# Patient Record
Sex: FEMALE | Race: BLACK OR AFRICAN AMERICAN | Employment: FULL TIME | URBAN - METROPOLITAN AREA
[De-identification: names, ages, dates, MRNs, and addresses within clinical notes are randomized per-mention and may not be internally consistent; named-entity substitution may affect disease eponyms.]

---

## 2018-07-26 LAB
HBV SURFACE AG SERPL QL IA: NORMAL
HIV 1+2 AB+HIV1 P24 AG SERPL QL IA: NEGATIVE
RUBELLA ANTIBODY IGG QUANTITATIVE: NORMAL IU/ML

## 2019-02-07 SDOH — HEALTH STABILITY: MENTAL HEALTH: HOW OFTEN DO YOU HAVE A DRINK CONTAINING ALCOHOL?: NEVER

## 2019-02-11 NOTE — PHARMACY-ADMISSION MEDICATION HISTORY
Admission medication history interview status for this patient is complete. See [facility] admission navigator for allergy information, prior to admission medications and immunization status.     PTA meds completed by pre-admitting nurse Katelyn Rutherford and reviewed by pharmacy       Prior to Admission medications    Medication Sig Last Dose Taking? Auth Provider   omeprazole (PRILOSEC) 20 MG DR capsule Take 20 mg by mouth daily  Yes Reported, Patient   Prenatal MV-Min-Fe Fum-FA-DHA (PRENATAL 1 PO) Take 1 tablet by mouth daily   Yes Reported, Patient

## 2019-02-12 ENCOUNTER — HOSPITAL ENCOUNTER (OUTPATIENT)
Dept: LAB | Facility: CLINIC | Age: 39
Discharge: HOME | End: 2019-02-12
Attending: OBSTETRICS & GYNECOLOGY | Admitting: OBSTETRICS & GYNECOLOGY
Payer: COMMERCIAL

## 2019-02-12 DIAGNOSIS — Z01.812 PRE-OPERATIVE LABORATORY EXAMINATION: ICD-10-CM

## 2019-02-12 LAB
ABO + RH BLD: NORMAL
ABO + RH BLD: NORMAL
BLD GP AB SCN SERPL QL: NORMAL
BLOOD BANK CMNT PATIENT-IMP: NORMAL
HGB BLD-MCNC: 11.9 G/DL (ref 11.7–15.7)
SPECIMEN EXP DATE BLD: NORMAL

## 2019-02-12 PROCEDURE — 85018 HEMOGLOBIN: CPT | Performed by: OBSTETRICS & GYNECOLOGY

## 2019-02-12 PROCEDURE — 36415 COLL VENOUS BLD VENIPUNCTURE: CPT | Performed by: OBSTETRICS & GYNECOLOGY

## 2019-02-12 PROCEDURE — 86901 BLOOD TYPING SEROLOGIC RH(D): CPT | Performed by: OBSTETRICS & GYNECOLOGY

## 2019-02-12 PROCEDURE — 86850 RBC ANTIBODY SCREEN: CPT | Performed by: OBSTETRICS & GYNECOLOGY

## 2019-02-12 PROCEDURE — 86900 BLOOD TYPING SEROLOGIC ABO: CPT | Performed by: OBSTETRICS & GYNECOLOGY

## 2019-02-12 PROCEDURE — 0064U ANTB TP TOTAL&RPR IA QUAL: CPT | Performed by: OBSTETRICS & GYNECOLOGY

## 2019-02-13 ENCOUNTER — ANESTHESIA (OUTPATIENT)
Dept: OBGYN | Facility: CLINIC | Age: 39
End: 2019-02-13
Payer: COMMERCIAL

## 2019-02-13 ENCOUNTER — ANESTHESIA EVENT (OUTPATIENT)
Dept: OBGYN | Facility: CLINIC | Age: 39
End: 2019-02-13
Payer: COMMERCIAL

## 2019-02-13 ENCOUNTER — HOSPITAL ENCOUNTER (INPATIENT)
Facility: CLINIC | Age: 39
LOS: 3 days | Discharge: HOME | End: 2019-02-16
Attending: OBSTETRICS & GYNECOLOGY | Admitting: OBSTETRICS & GYNECOLOGY
Payer: COMMERCIAL

## 2019-02-13 DIAGNOSIS — Z98.891 S/P CESAREAN SECTION: ICD-10-CM

## 2019-02-13 DIAGNOSIS — D62 POSTOPERATIVE ANEMIA DUE TO ACUTE BLOOD LOSS: Primary | ICD-10-CM

## 2019-02-13 LAB — T PALLIDUM AB SER QL: NONREACTIVE

## 2019-02-13 PROCEDURE — 12000000 ZZH R&B MED SURG/OB

## 2019-02-13 PROCEDURE — 25000128 H RX IP 250 OP 636: Performed by: OBSTETRICS & GYNECOLOGY

## 2019-02-13 PROCEDURE — 25000125 ZZHC RX 250: Performed by: NURSE ANESTHETIST, CERTIFIED REGISTERED

## 2019-02-13 PROCEDURE — 37000009 ZZH ANESTHESIA TECHNICAL FEE, EACH ADDTL 15 MIN: Performed by: OBSTETRICS & GYNECOLOGY

## 2019-02-13 PROCEDURE — 71000012 ZZH RECOVERY PHASE 1 LEVEL 1 FIRST HR: Performed by: OBSTETRICS & GYNECOLOGY

## 2019-02-13 PROCEDURE — 25000132 ZZH RX MED GY IP 250 OP 250 PS 637: Performed by: OBSTETRICS & GYNECOLOGY

## 2019-02-13 PROCEDURE — 25800030 ZZH RX IP 258 OP 636: Performed by: OBSTETRICS & GYNECOLOGY

## 2019-02-13 PROCEDURE — 37000008 ZZH ANESTHESIA TECHNICAL FEE, 1ST 30 MIN: Performed by: OBSTETRICS & GYNECOLOGY

## 2019-02-13 PROCEDURE — 25000128 H RX IP 250 OP 636: Performed by: NURSE ANESTHETIST, CERTIFIED REGISTERED

## 2019-02-13 PROCEDURE — 27210794 ZZH OR GENERAL SUPPLY STERILE: Performed by: OBSTETRICS & GYNECOLOGY

## 2019-02-13 PROCEDURE — 25000125 ZZHC RX 250: Performed by: OBSTETRICS & GYNECOLOGY

## 2019-02-13 PROCEDURE — 0DNW0ZZ RELEASE PERITONEUM, OPEN APPROACH: ICD-10-PCS | Performed by: OBSTETRICS & GYNECOLOGY

## 2019-02-13 PROCEDURE — 36000056 ZZH SURGERY LEVEL 3 1ST 30 MIN: Performed by: OBSTETRICS & GYNECOLOGY

## 2019-02-13 PROCEDURE — 36000058 ZZH SURGERY LEVEL 3 EA 15 ADDTL MIN: Performed by: OBSTETRICS & GYNECOLOGY

## 2019-02-13 PROCEDURE — 71000013 ZZH RECOVERY PHASE 1 LEVEL 1 EA ADDTL HR: Performed by: OBSTETRICS & GYNECOLOGY

## 2019-02-13 RX ORDER — OXYTOCIN/0.9 % SODIUM CHLORIDE 30/500 ML
PLASTIC BAG, INJECTION (ML) INTRAVENOUS PRN
Status: DISCONTINUED | OUTPATIENT
Start: 2019-02-13 | End: 2019-02-13

## 2019-02-13 RX ORDER — AMOXICILLIN 250 MG
1 CAPSULE ORAL 2 TIMES DAILY PRN
Status: DISCONTINUED | OUTPATIENT
Start: 2019-02-13 | End: 2019-02-16 | Stop reason: HOSPADM

## 2019-02-13 RX ORDER — SODIUM CHLORIDE, SODIUM LACTATE, POTASSIUM CHLORIDE, CALCIUM CHLORIDE 600; 310; 30; 20 MG/100ML; MG/100ML; MG/100ML; MG/100ML
INJECTION, SOLUTION INTRAVENOUS CONTINUOUS
Status: DISCONTINUED | OUTPATIENT
Start: 2019-02-13 | End: 2019-02-13

## 2019-02-13 RX ORDER — HYDROCORTISONE 2.5 %
CREAM (GRAM) TOPICAL 3 TIMES DAILY PRN
Status: DISCONTINUED | OUTPATIENT
Start: 2019-02-13 | End: 2019-02-16 | Stop reason: HOSPADM

## 2019-02-13 RX ORDER — OXYTOCIN 10 [USP'U]/ML
10 INJECTION, SOLUTION INTRAMUSCULAR; INTRAVENOUS
Status: DISCONTINUED | OUTPATIENT
Start: 2019-02-13 | End: 2019-02-16 | Stop reason: HOSPADM

## 2019-02-13 RX ORDER — OXYTOCIN/0.9 % SODIUM CHLORIDE 30/500 ML
100 PLASTIC BAG, INJECTION (ML) INTRAVENOUS CONTINUOUS
Status: DISCONTINUED | OUTPATIENT
Start: 2019-02-13 | End: 2019-02-16 | Stop reason: HOSPADM

## 2019-02-13 RX ORDER — BISACODYL 10 MG
10 SUPPOSITORY, RECTAL RECTAL DAILY PRN
Status: DISCONTINUED | OUTPATIENT
Start: 2019-02-15 | End: 2019-02-16 | Stop reason: HOSPADM

## 2019-02-13 RX ORDER — NALOXONE HYDROCHLORIDE 0.4 MG/ML
.1-.4 INJECTION, SOLUTION INTRAMUSCULAR; INTRAVENOUS; SUBCUTANEOUS
Status: DISCONTINUED | OUTPATIENT
Start: 2019-02-13 | End: 2019-02-16 | Stop reason: HOSPADM

## 2019-02-13 RX ORDER — EPHEDRINE SULFATE 50 MG/ML
INJECTION, SOLUTION INTRAVENOUS PRN
Status: DISCONTINUED | OUTPATIENT
Start: 2019-02-13 | End: 2019-02-13

## 2019-02-13 RX ORDER — ONDANSETRON 4 MG/1
4 TABLET, ORALLY DISINTEGRATING ORAL EVERY 30 MIN PRN
Status: DISCONTINUED | OUTPATIENT
Start: 2019-02-13 | End: 2019-02-13 | Stop reason: HOSPADM

## 2019-02-13 RX ORDER — OXYTOCIN/0.9 % SODIUM CHLORIDE 30/500 ML
340 PLASTIC BAG, INJECTION (ML) INTRAVENOUS CONTINUOUS PRN
Status: DISCONTINUED | OUTPATIENT
Start: 2019-02-13 | End: 2019-02-16 | Stop reason: HOSPADM

## 2019-02-13 RX ORDER — BUPIVACAINE HYDROCHLORIDE 7.5 MG/ML
INJECTION, SOLUTION INTRASPINAL PRN
Status: DISCONTINUED | OUTPATIENT
Start: 2019-02-13 | End: 2019-02-13

## 2019-02-13 RX ORDER — FENTANYL CITRATE 50 UG/ML
25-50 INJECTION, SOLUTION INTRAMUSCULAR; INTRAVENOUS
Status: DISCONTINUED | OUTPATIENT
Start: 2019-02-13 | End: 2019-02-13 | Stop reason: HOSPADM

## 2019-02-13 RX ORDER — CARBOPROST TROMETHAMINE 250 UG/ML
250 INJECTION, SOLUTION INTRAMUSCULAR
Status: DISCONTINUED | OUTPATIENT
Start: 2019-02-13 | End: 2019-02-16 | Stop reason: HOSPADM

## 2019-02-13 RX ORDER — SIMETHICONE 80 MG
80 TABLET,CHEWABLE ORAL 4 TIMES DAILY PRN
Status: DISCONTINUED | OUTPATIENT
Start: 2019-02-13 | End: 2019-02-16 | Stop reason: HOSPADM

## 2019-02-13 RX ORDER — HYDROMORPHONE HYDROCHLORIDE 1 MG/ML
.3-.5 INJECTION, SOLUTION INTRAMUSCULAR; INTRAVENOUS; SUBCUTANEOUS EVERY 30 MIN PRN
Status: DISCONTINUED | OUTPATIENT
Start: 2019-02-13 | End: 2019-02-16 | Stop reason: HOSPADM

## 2019-02-13 RX ORDER — ONDANSETRON 2 MG/ML
4 INJECTION INTRAMUSCULAR; INTRAVENOUS EVERY 6 HOURS PRN
Status: DISCONTINUED | OUTPATIENT
Start: 2019-02-13 | End: 2019-02-16 | Stop reason: HOSPADM

## 2019-02-13 RX ORDER — CITRIC ACID/SODIUM CITRATE 334-500MG
30 SOLUTION, ORAL ORAL
Status: COMPLETED | OUTPATIENT
Start: 2019-02-13 | End: 2019-02-13

## 2019-02-13 RX ORDER — ONDANSETRON 2 MG/ML
4 INJECTION INTRAMUSCULAR; INTRAVENOUS EVERY 30 MIN PRN
Status: DISCONTINUED | OUTPATIENT
Start: 2019-02-13 | End: 2019-02-13 | Stop reason: HOSPADM

## 2019-02-13 RX ORDER — MAGNESIUM HYDROXIDE 1200 MG/15ML
LIQUID ORAL PRN
Status: DISCONTINUED | OUTPATIENT
Start: 2019-02-13 | End: 2019-02-13

## 2019-02-13 RX ORDER — AMOXICILLIN 250 MG
2 CAPSULE ORAL 2 TIMES DAILY PRN
Status: DISCONTINUED | OUTPATIENT
Start: 2019-02-13 | End: 2019-02-16 | Stop reason: HOSPADM

## 2019-02-13 RX ORDER — ACETAMINOPHEN 325 MG/1
975 TABLET ORAL EVERY 8 HOURS
Status: COMPLETED | OUTPATIENT
Start: 2019-02-13 | End: 2019-02-16

## 2019-02-13 RX ORDER — IBUPROFEN 800 MG/1
800 TABLET, FILM COATED ORAL EVERY 6 HOURS PRN
Status: DISCONTINUED | OUTPATIENT
Start: 2019-02-13 | End: 2019-02-16 | Stop reason: HOSPADM

## 2019-02-13 RX ORDER — SODIUM CHLORIDE, SODIUM LACTATE, POTASSIUM CHLORIDE, CALCIUM CHLORIDE 600; 310; 30; 20 MG/100ML; MG/100ML; MG/100ML; MG/100ML
INJECTION, SOLUTION INTRAVENOUS CONTINUOUS
Status: DISCONTINUED | OUTPATIENT
Start: 2019-02-13 | End: 2019-02-13 | Stop reason: HOSPADM

## 2019-02-13 RX ORDER — KETOROLAC TROMETHAMINE 30 MG/ML
30 INJECTION, SOLUTION INTRAMUSCULAR; INTRAVENOUS EVERY 6 HOURS
Status: DISPENSED | OUTPATIENT
Start: 2019-02-13 | End: 2019-02-14

## 2019-02-13 RX ORDER — LIDOCAINE 40 MG/G
CREAM TOPICAL
Status: DISCONTINUED | OUTPATIENT
Start: 2019-02-13 | End: 2019-02-16 | Stop reason: HOSPADM

## 2019-02-13 RX ORDER — MORPHINE SULFATE 1 MG/ML
INJECTION, SOLUTION EPIDURAL; INTRATHECAL; INTRAVENOUS PRN
Status: DISCONTINUED | OUTPATIENT
Start: 2019-02-13 | End: 2019-02-13

## 2019-02-13 RX ORDER — DEXTROSE, SODIUM CHLORIDE, SODIUM LACTATE, POTASSIUM CHLORIDE, AND CALCIUM CHLORIDE 5; .6; .31; .03; .02 G/100ML; G/100ML; G/100ML; G/100ML; G/100ML
INJECTION, SOLUTION INTRAVENOUS CONTINUOUS
Status: DISCONTINUED | OUTPATIENT
Start: 2019-02-13 | End: 2019-02-16 | Stop reason: HOSPADM

## 2019-02-13 RX ORDER — ONDANSETRON 2 MG/ML
4 INJECTION INTRAMUSCULAR; INTRAVENOUS EVERY 4 HOURS PRN
Status: DISCONTINUED | OUTPATIENT
Start: 2019-02-13 | End: 2019-02-15

## 2019-02-13 RX ORDER — LANOLIN 100 %
OINTMENT (GRAM) TOPICAL
Status: DISCONTINUED | OUTPATIENT
Start: 2019-02-13 | End: 2019-02-16 | Stop reason: HOSPADM

## 2019-02-13 RX ORDER — ACETAMINOPHEN 325 MG/1
650 TABLET ORAL EVERY 4 HOURS PRN
Status: DISCONTINUED | OUTPATIENT
Start: 2019-02-16 | End: 2019-02-16 | Stop reason: HOSPADM

## 2019-02-13 RX ORDER — CEFAZOLIN SODIUM 2 G/100ML
2 INJECTION, SOLUTION INTRAVENOUS
Status: COMPLETED | OUTPATIENT
Start: 2019-02-13 | End: 2019-02-13

## 2019-02-13 RX ORDER — METHYLERGONOVINE MALEATE 0.2 MG/ML
200 INJECTION INTRAVENOUS
Status: DISCONTINUED | OUTPATIENT
Start: 2019-02-13 | End: 2019-02-16 | Stop reason: HOSPADM

## 2019-02-13 RX ORDER — OXYCODONE HYDROCHLORIDE 5 MG/1
5-10 TABLET ORAL
Status: DISCONTINUED | OUTPATIENT
Start: 2019-02-13 | End: 2019-02-16 | Stop reason: HOSPADM

## 2019-02-13 RX ORDER — KETOROLAC TROMETHAMINE 30 MG/ML
INJECTION, SOLUTION INTRAMUSCULAR; INTRAVENOUS PRN
Status: DISCONTINUED | OUTPATIENT
Start: 2019-02-13 | End: 2019-02-13

## 2019-02-13 RX ORDER — DEXAMETHASONE SODIUM PHOSPHATE 4 MG/ML
INJECTION, SOLUTION INTRA-ARTICULAR; INTRALESIONAL; INTRAMUSCULAR; INTRAVENOUS; SOFT TISSUE PRN
Status: DISCONTINUED | OUTPATIENT
Start: 2019-02-13 | End: 2019-02-13

## 2019-02-13 RX ORDER — NALOXONE HYDROCHLORIDE 0.4 MG/ML
.1-.4 INJECTION, SOLUTION INTRAMUSCULAR; INTRAVENOUS; SUBCUTANEOUS
Status: ACTIVE | OUTPATIENT
Start: 2019-02-13 | End: 2019-02-14

## 2019-02-13 RX ORDER — MISOPROSTOL 200 UG/1
800 TABLET ORAL
Status: DISCONTINUED | OUTPATIENT
Start: 2019-02-13 | End: 2019-02-16 | Stop reason: HOSPADM

## 2019-02-13 RX ORDER — NALBUPHINE HYDROCHLORIDE 10 MG/ML
2.5-5 INJECTION, SOLUTION INTRAMUSCULAR; INTRAVENOUS; SUBCUTANEOUS EVERY 6 HOURS PRN
Status: DISCONTINUED | OUTPATIENT
Start: 2019-02-13 | End: 2019-02-16 | Stop reason: HOSPADM

## 2019-02-13 RX ADMIN — OXYTOCIN-SODIUM CHLORIDE 0.9% IV SOLN 30 UNIT/500ML 299 ML: 30-0.9/5 SOLUTION at 10:05

## 2019-02-13 RX ADMIN — Medication 0.3 MG: at 08:58

## 2019-02-13 RX ADMIN — EPHEDRINE SULFATE 5 MG: 50 INJECTION, SOLUTION INTRAVENOUS at 09:07

## 2019-02-13 RX ADMIN — PHENYLEPHRINE HYDROCHLORIDE 100 MCG: 10 INJECTION, SOLUTION INTRAMUSCULAR; INTRAVENOUS; SUBCUTANEOUS at 09:16

## 2019-02-13 RX ADMIN — EPHEDRINE SULFATE 5 MG: 50 INJECTION, SOLUTION INTRAVENOUS at 09:38

## 2019-02-13 RX ADMIN — BUPIVACAINE HYDROCHLORIDE IN DEXTROSE 10.5 MG: 7.5 INJECTION, SOLUTION SUBARACHNOID at 08:58

## 2019-02-13 RX ADMIN — EPHEDRINE SULFATE 5 MG: 50 INJECTION, SOLUTION INTRAVENOUS at 09:16

## 2019-02-13 RX ADMIN — OXYTOCIN-SODIUM CHLORIDE 0.9% IV SOLN 30 UNIT/500ML 100 ML/HR: 30-0.9/5 SOLUTION at 12:10

## 2019-02-13 RX ADMIN — KETOROLAC TROMETHAMINE 30 MG: 30 INJECTION, SOLUTION INTRAMUSCULAR at 10:05

## 2019-02-13 RX ADMIN — OXYTOCIN-SODIUM CHLORIDE 0.9% IV SOLN 30 UNIT/500ML 1 ML: 30-0.9/5 SOLUTION at 09:25

## 2019-02-13 RX ADMIN — SODIUM CHLORIDE, POTASSIUM CHLORIDE, SODIUM LACTATE AND CALCIUM CHLORIDE: 600; 310; 30; 20 INJECTION, SOLUTION INTRAVENOUS at 08:51

## 2019-02-13 RX ADMIN — KETOROLAC TROMETHAMINE 30 MG: 30 INJECTION, SOLUTION INTRAMUSCULAR at 16:26

## 2019-02-13 RX ADMIN — PHENYLEPHRINE HYDROCHLORIDE 100 MCG: 10 INJECTION, SOLUTION INTRAMUSCULAR; INTRAVENOUS; SUBCUTANEOUS at 09:38

## 2019-02-13 RX ADMIN — SODIUM CHLORIDE, POTASSIUM CHLORIDE, SODIUM LACTATE AND CALCIUM CHLORIDE 1000 ML: 600; 310; 30; 20 INJECTION, SOLUTION INTRAVENOUS at 08:05

## 2019-02-13 RX ADMIN — PHENYLEPHRINE HYDROCHLORIDE 100 MCG: 10 INJECTION, SOLUTION INTRAMUSCULAR; INTRAVENOUS; SUBCUTANEOUS at 09:06

## 2019-02-13 RX ADMIN — SODIUM CHLORIDE, SODIUM LACTATE, POTASSIUM CHLORIDE, CALCIUM CHLORIDE AND DEXTROSE MONOHYDRATE: 5; 600; 310; 30; 20 INJECTION, SOLUTION INTRAVENOUS at 17:54

## 2019-02-13 RX ADMIN — KETOROLAC TROMETHAMINE 30 MG: 30 INJECTION, SOLUTION INTRAMUSCULAR at 23:01

## 2019-02-13 RX ADMIN — SODIUM CHLORIDE, POTASSIUM CHLORIDE, SODIUM LACTATE AND CALCIUM CHLORIDE: 600; 310; 30; 20 INJECTION, SOLUTION INTRAVENOUS at 09:13

## 2019-02-13 RX ADMIN — CEFAZOLIN SODIUM 2 G: 2 INJECTION, SOLUTION INTRAVENOUS at 08:51

## 2019-02-13 RX ADMIN — ACETAMINOPHEN 975 MG: 325 TABLET, FILM COATED ORAL at 20:31

## 2019-02-13 RX ADMIN — ACETAMINOPHEN 975 MG: 325 TABLET, FILM COATED ORAL at 12:10

## 2019-02-13 RX ADMIN — DEXAMETHASONE SODIUM PHOSPHATE 4 MG: 4 INJECTION, SOLUTION INTRA-ARTICULAR; INTRALESIONAL; INTRAMUSCULAR; INTRAVENOUS; SOFT TISSUE at 09:04

## 2019-02-13 RX ADMIN — PHENYLEPHRINE HYDROCHLORIDE 100 MCG: 10 INJECTION, SOLUTION INTRAMUSCULAR; INTRAVENOUS; SUBCUTANEOUS at 09:04

## 2019-02-13 RX ADMIN — SODIUM CITRATE AND CITRIC ACID MONOHYDRATE 30 ML: 500; 334 SOLUTION ORAL at 08:19

## 2019-02-13 ASSESSMENT — MIFFLIN-ST. JEOR: SCORE: 1198.91

## 2019-02-13 ASSESSMENT — ACTIVITIES OF DAILY LIVING (ADL)
RETIRED_EATING: 0-->INDEPENDENT
FALL_HISTORY_WITHIN_LAST_SIX_MONTHS: NO
AMBULATION: 0-->INDEPENDENT
COGNITION: 0 - NO COGNITION ISSUES REPORTED
RETIRED_COMMUNICATION: 0-->UNDERSTANDS/COMMUNICATES WITHOUT DIFFICULTY
TRANSFERRING: 0-->INDEPENDENT
TOILETING: 0-->INDEPENDENT
SWALLOWING: 0-->SWALLOWS FOODS/LIQUIDS WITHOUT DIFFICULTY
BATHING: 0-->INDEPENDENT
DRESS: 0-->INDEPENDENT

## 2019-02-13 ASSESSMENT — COLUMBIA-SUICIDE SEVERITY RATING SCALE - C-SSRS
2. HAVE YOU ACTUALLY HAD ANY THOUGHTS OF KILLING YOURSELF IN THE PAST MONTH?: NO
1. IN THE PAST MONTH, HAVE YOU WISHED YOU WERE DEAD OR WISHED YOU COULD GO TO SLEEP AND NOT WAKE UP?: NO
6. HAVE YOU EVER DONE ANYTHING, STARTED TO DO ANYTHING, OR PREPARED TO DO ANYTHING TO END YOUR LIFE?: NO

## 2019-02-13 NOTE — ANESTHESIA CARE TRANSFER NOTE
Patient: Georgiana Montana    Procedure(s):  repeat  section    Diagnosis: PREVIOUS  Diagnosis Additional Information: No value filed.    Anesthesia Type:   Spinal     Note:  Airway :Room Air  Patient transferred to:Labor and Delivery  Handoff Report: Identifed the Patient, Identified the Reponsible Provider, Reviewed the pertinent medical history, Discussed the surgical course, Reviewed Intra-OP anesthesia mangement and issues during anesthesia, Set expectations for post-procedure period and Allowed opportunity for questions and acknowledgement of understanding      Vitals: (Last set prior to Anesthesia Care Transfer)    CRNA VITALS  2019 0942 - 2019 1029      2019             Pulse:  73    SpO2:  100 %                Electronically Signed By: ROBERT Baez CRNA  2019  10:29 AM

## 2019-02-13 NOTE — ANESTHESIA PROCEDURE NOTES
Peripheral nerve/Neuraxial procedure note : intrathecal  Pre-Procedure  Performed by Sriram Agarwal MD  Location: OR, OB      Pre-Anesthestic Checklist: patient identified, IV checked, risks and benefits discussed, informed consent, monitors and equipment checked and pre-op evaluation    Timeout  Correct Patient: Yes   Correct Procedure: Yes   Correct Site: Yes   Correct Laterality: N/A   Correct Position: Yes   Site Marked: No   .   Procedure Documentation  ASA 2  .    Procedure:    Intrathecal.  Insertion Site:L3-4  (midline approach)      Patient Prep;mask, sterile gloves, povidone-iodine 7.5% surgical scrub.  .  Needle: Ariadna tip Spinal Needle (gauge): 22  Spinal/LP Needle Length (inches): 3.5 # of attempts: 1 and # of redirects:  No introducer used .       Assessment/Narrative  Paresthesias: No.  .  .  clear CSF fluid removed while sitting   . Comments:  [id number], lot [id number], exp. 2020-06-30    10.5 mg bupivicaine and 0.3 mg morphine placed.

## 2019-02-13 NOTE — ANESTHESIA POSTPROCEDURE EVALUATION
Patient: Georgiana Montana    Procedure(s):  repeat  section    Diagnosis:PREVIOUS  Diagnosis Additional Information: IUP at 39w0d  History of previous  x 2  Advanced maternal age  Inadequate prenatal care   Post-operative diagnosis: Same  Significant scarring of the uterus and bladder to the anterior abdominal wall        Anesthesia Type:  Spinal    Note:  Anesthesia Post Evaluation    Patient location during evaluation: PACU  Patient participation: Able to fully participate in evaluation  Level of consciousness: awake  Pain management: adequate  Airway patency: patent  Cardiovascular status: acceptable  Respiratory status: acceptable  Hydration status: acceptable  PONV: controlled     Anesthetic complications: None    Comments: .Anticipate full return of neurologic function          Last vitals:  Vitals:    19 1041 19 1051 19 1052   BP:   99/54   Pulse:      Resp:      Temp:      SpO2: 97% 99%          Electronically Signed By: Bhupinder Arriaga DO  2019  11:05 AM

## 2019-02-13 NOTE — PLAN OF CARE
Patient admitted to postpartum and oriented to room with . Went over paperwork and what to expect for baby and mom care. No pain and incision covered with liquid bandage. Laguna patent. WIll monitor.

## 2019-02-13 NOTE — PLAN OF CARE
Spoke with Dr. Hay prior to C/S about GBS status. Patient had not been into to clinic this last month for visits. GBS not performed in clinic. Patient is a scheduled C/S, membranes intact and not laboring. No need for HBIG for baby.

## 2019-02-13 NOTE — PLAN OF CARE
Data: Georgiana Montana transferred to 425  via cart at 1340 . Baby transferred via parent's arms.  Action: Receiving unit notified of transfer: Yes. Patient and family notified of room change. Report given to Carolin KRISHNAMURTHY RN  at time of transfer. Belongings sent to receiving unit. Accompanied by Registered Nurse. Oriented patient to surroundings. Call light within reach. ID bands double-checked with receiving RN.  Response: Patient tolerated transfer and is stable.

## 2019-02-13 NOTE — OP NOTE
[facility], [place]   Section Operative Note    Georgiana Montana  [id number]  2019    Pre-operative diagnosis: IUP at 39w0d  History of previous  x 2  Advanced maternal age  Inadequate prenatal care   Post-operative diagnosis: Same  Significant scarring of the uterus and bladder to the anterior abdominal wall   Procedure: Repeat low transverse  section   Surgeon: Franci Hay MD   Assistant(s): Gal Sheriff MD   Anesthesia: Spinal anesthesia   Estimated blood loss: QBL pending   Total IV fluids: 2000 mL   Blood transfusion: No transfusion was given during surgery   Total urine output: 200ml   Drains: Laguna catheter   Specimens: None   Findings: Single liveborn female infant in cephalic presentation. Apgars of 8 and 9. Weight 3130 grams. There was significant scarring of the lower uterine segment and bladder to the anterior abdominal wall fascia that required extensive lysis of adhesions. Uterine serosa and small layer of muscle was disrupted superior to the incision related to lysis of adhesions that did require repair for hemostasis.  Normal ovaries and fallopian tubes. Based on surgical findings, I would strongly recommend additional C-sections being completed using a vertical midline incision. I did discuss this with the patient following completion of surgery.      Complications: None.       Indications: This patient is a 38 year old  at 39w0d by 9w1d US who presented today for scheduled repeat  section secondary to history of previous  x 2. Pregnancy was additionally complicated by advanced maternal age and inadequate prenatal care. Because of the inadequate prenatal care, patient did not have a GBS test done in clinic.     Procedure Details:  The risks, benefits, complications, treatment options, and expected outcomes were discussed with the patient.  The patient concurred with the proposed plan, giving informed consent.  The  site of surgery properly noted/marked. The patient was taken to the OR, identified as Goergiana Montana and the procedure verified as  Delivery. A Time Out was held and the above information confirmed.    After induction of Spinal anesthesia, the patient was draped and prepped in the usual sterile manner. A Pfannenstiel incision was made and carried down with cautery through the subcutaneous tissue to the fascia. Fascia was nicked on either side of the midline and incision was extended using blunt and sharp dissection. The fascia was  from the underlying rectus tissue superiorly and inferiorly using blunt and sharp dissection. An opening in the peritoneum was noted superiorly. With careful inspection, it was apparent that this was the uterus and there was significant scarring of the lower uterine segment to the fascia. Careful dissection was undertaken to identify anatomical planes as the bladder was not easily identifiable either. After about 20 minutes, we are able to adequately expose the lower uterine segment and identify the bladder. Bladder blade was placed. The lower uterine segment was noted to be thin so the incision was made slightly above this area though overall still in the lower uterine segment. A low transverse uterine incision was made and extended using cephalad-caudad digital pressure. Fetus was delivered atraumatically. The umbilical cord was clamped and cut after one minute of delayed cord clamping and baby was brought over to waiting RN staff. Cord blood was obtained for evaluation. The placenta was removed intact via gentle cord traction and uterine massage and appeared normal. The uterus was then exteriorized after one additional thick band of adhesions was taken down and cleared of all clot and debris. The uterine incision was closed in double layer fashion with a running locked suture of 0 Vicryl and an imbricating stitch of 0 Monocryl. At this time, there was ongoing bleeding  from the disrupted serosa/muscle above the incision. This area was sutured initially with a running locked suture of 0 Vicryl with some improvement. Following this, there was still some ongoing bleeding and so several figure of X sutures of 0 Monocryl were placed with significant improvement in hemostasis. The uterus was returned to the abdomen and the hysterotomy was re-inspected with good hemostasis noted. Pericolic gutters were cleared of clot and debris and irrigated. Given multiple raw edges, Qi powder was placed over the incision. The bladder flap was also noted to be hemostatic. Attention was turned to the fascia where examination revealed some areas of bleeding that were cauterized with good improvement. The fascia was then reapproximated with running sutures of 0 Vicryl. Subcutaneous tissue was copiously irrigated at this time. Subcutaneous tissue was was not reapproximated. The skin was then closed with 4-0 Vicryl followed by skin glue.    Instrument, sponge, and needle counts were correct prior the abdominal closure and at the conclusion of the case.     Ashley Hieronimus, MD [facility]  2/13/2019 10:31 AM

## 2019-02-13 NOTE — ANESTHESIA PREPROCEDURE EVALUATION
"Anesthesia Pre-Procedure Evaluation    Patient: Georgiana Montana   MRN: [id number] : 1980          Preoperative Diagnosis: PREVIOUS    Procedure(s):  repeat  section    Past Medical History:   Diagnosis Date     Gastroesophageal reflux disease      Past Surgical History:   Procedure Laterality Date      SECTION       2 previous     GYN SURGERY      c/s x2     Anesthesia Evaluation     . Pt has had prior anesthetic.     No history of anesthetic complications          ROS/MED HX    ENT/Pulmonary:  - neg pulmonary ROS     Neurologic:  - neg neurologic ROS     Cardiovascular:  - neg cardiovascular ROS       METS/Exercise Tolerance:     Hematologic:  - neg hematologic  ROS       Musculoskeletal:  - neg musculoskeletal ROS       GI/Hepatic:     (+) GERD       Renal/Genitourinary:  - ROS Renal section negative       Endo:  - neg endo ROS       Psychiatric:  - neg psychiatric ROS       Infectious Disease:         Malignancy:      - no malignancy   Other:    (+) Possibly pregnant no H/O Chronic Pain,no other significant disability                         Physical Exam  Normal systems: cardiovascular, pulmonary and dental    Airway   Mallampati: II  TM distance: >3 FB  Neck ROM: full    Dental     Cardiovascular       Pulmonary             Lab Results   Component Value Date    HGB 11.9 2019       Preop Vitals  BP Readings from Last 3 Encounters:   19 108/72    Pulse Readings from Last 3 Encounters:   19 88      Resp Readings from Last 3 Encounters:   19 20    SpO2 Readings from Last 3 Encounters:   No data found for SpO2      Temp Readings from Last 1 Encounters:   19 97.9  F (36.6  C) (Oral)    Ht Readings from Last 1 Encounters:   19 1.6 m (5' 3\")      Wt Readings from Last 1 Encounters:   19 54 kg (119 lb)    Estimated body mass index is 21.08 kg/m  as calculated from the following:    Height as of this encounter: 1.6 m (5' 3\").    Weight as of this encounter: 54 " kg (119 lb).       Anesthesia Plan      History & Physical Review  History and physical reviewed and following examination; no interval change.    ASA Status:  2 .        Plan for Spinal   PONV prophylaxis:  Ondansetron (or other 5HT-3)       Postoperative Care  Postoperative pain management:  IV analgesics, Oral pain medications and Neuraxial analgesia.      Consents  Anesthetic plan, risks, benefits and alternatives discussed with:  Patient..                 Sriram Agarwal MD                    .

## 2019-02-14 LAB — HGB BLD-MCNC: 9 G/DL (ref 11.7–15.7)

## 2019-02-14 PROCEDURE — 12000000 ZZH R&B MED SURG/OB

## 2019-02-14 PROCEDURE — 25000132 ZZH RX MED GY IP 250 OP 250 PS 637: Performed by: OBSTETRICS & GYNECOLOGY

## 2019-02-14 PROCEDURE — 85018 HEMOGLOBIN: CPT | Performed by: OBSTETRICS & GYNECOLOGY

## 2019-02-14 PROCEDURE — 36415 COLL VENOUS BLD VENIPUNCTURE: CPT | Performed by: OBSTETRICS & GYNECOLOGY

## 2019-02-14 PROCEDURE — 25000128 H RX IP 250 OP 636: Performed by: OBSTETRICS & GYNECOLOGY

## 2019-02-14 RX ADMIN — OXYCODONE HYDROCHLORIDE 5 MG: 5 TABLET ORAL at 18:46

## 2019-02-14 RX ADMIN — OXYCODONE HYDROCHLORIDE 5 MG: 5 TABLET ORAL at 15:30

## 2019-02-14 RX ADMIN — ACETAMINOPHEN 975 MG: 325 TABLET, FILM COATED ORAL at 12:58

## 2019-02-14 RX ADMIN — ACETAMINOPHEN 975 MG: 325 TABLET, FILM COATED ORAL at 21:24

## 2019-02-14 RX ADMIN — SIMETHICONE CHEW TAB 80 MG 80 MG: 80 TABLET ORAL at 12:58

## 2019-02-14 RX ADMIN — KETOROLAC TROMETHAMINE 30 MG: 30 INJECTION, SOLUTION INTRAMUSCULAR at 06:21

## 2019-02-14 RX ADMIN — SENNOSIDES AND DOCUSATE SODIUM 1 TABLET: 8.6; 5 TABLET ORAL at 18:46

## 2019-02-14 RX ADMIN — IBUPROFEN 800 MG: 800 TABLET, FILM COATED ORAL at 12:58

## 2019-02-14 RX ADMIN — OXYCODONE HYDROCHLORIDE 5 MG: 5 TABLET ORAL at 22:16

## 2019-02-14 RX ADMIN — ACETAMINOPHEN 975 MG: 325 TABLET, FILM COATED ORAL at 04:20

## 2019-02-14 RX ADMIN — IBUPROFEN 800 MG: 800 TABLET, FILM COATED ORAL at 18:46

## 2019-02-14 NOTE — PLAN OF CARE
Doing well, out of bed with assist. Ambulated in halls, tolerated well. Taking clear liquid diet with crackers,denies nausea. Scheduled toradol and scheduled tylenol for pain with stated relief.Breast feeding with minimal staff assist with positioning and latch. FOB home to care for other kids, patient's sister here and supportive.

## 2019-02-14 NOTE — PROGRESS NOTES
[facility]   Brief Post-partum Note    Name:  Georgiana Montana  MRN: [id number]    S: Patient is doing well this Am.  Pain is adequately controlled.  Tolerating regular diet without nausea or vomiting.  Ambulating without dizziness.  Voiding spontaneously, passing flatus but no bowel movement as of yet. Lochia less than menses.  Breast and bottle feeding.  Unsure regarding contraception.  Plans discharge tomorrow.    O:   Patient Vitals for the past 24 hrs:   BP Temp Temp src Pulse Heart Rate Resp SpO2   19 0920 (!) 89/56 98.7  F (37.1  C) -- 83 -- 16 --   19 0432 9054 -- -- -- -- -- --   19 0404 (!) 83/48 98.1  F (36.7  C) Oral 68 -- 15 94 %   19 0027 92/53 98  F (36.7  C) Oral 81 92 15 95 %   19 2031 101/62 98.4  F (36.9  C) Oral 82 -- 18 --   19 1600 92/57 98.2  F (36.8  C) Oral 67 -- 18 99 %   19 1435 104/54 -- -- 66 -- 16 100 %   19 1332 106/59 -- -- -- -- -- 100 %   19 1300 90/62 -- -- -- -- -- 100 %   19 1242 -- -- -- -- -- 16 --   19 1236 -- -- -- -- -- -- 100 %   19 1231 -- -- -- -- -- -- 100 %   19 1227 96/60 -- -- -- -- 16 --   19 1226 -- -- -- -- -- -- 100 %   19 1052 99/54 -- -- -- -- -- --   19 1051 -- -- -- -- -- -- 99 %   19 1041 -- -- -- -- -- -- 97 %   19 1036 -- -- -- -- -- -- 99 %   19 1028 95/61 -- -- -- -- -- --   19 1027 95/54 -- -- -- -- -- --     Gen:  Resting comfortably, NAD  CV:  Regular rate and rhythm   Pulm:  Non-labored breathing.  No cough or wheezing.   Abd:  Soft, appropriately tender to palpation, non-distended.  Fundus at  umbilicus, firm and non-tender.  Incision:  Wound edges well approximated with Dermabond, no findings of erythema or induration.  Ext:  Non-tender, trace LE edema b/l    Hgb:   Recent Labs   Lab Test 19  0655   HGB 9.0*       Assessment/Plan:  38 year old  on POD #1 s/p scheduled RLTCS.  Continue with routine postpartum  "management. Pregnancy c/b AMA, scant PNC, prior C/S x2, GBS unknown given lack of PNC.    Of note, surgical op note states \"Based on surgical findings, I would strongly recommend additional C-sections being completed using a vertical midline incision. I did discuss this with the patient following completion of surgery.\"    Pain: Well-controlled with current regimen   Hgb: 11.9>> 9.0. VSS as noted above, asymptomatic. Will discharge home with PO iron.   GI:  BID Senna/Colace.  PRN Simethicone.   PPx:  Encouraged ambulation   Rh: Positive  Rubella: Immune  Feed: Breast and bottle  BC: Unsure, stressed operative findings including extensive adhesive disease and need for VML incision with future pregnancies.  Patient states she is aware and is contemplating LARC options vs. Depo.   Dispo: Plan for home on POD#2-3.     Paty Degroot MD   Pager: [phone]   February 14, 2019         "

## 2019-02-14 NOTE — LACTATION NOTE
This note was copied from a baby's chart.  LC visit.  She has been giving formula most times, and reported that she does not have any drops of colostrum on her right.  LC assisted with HE and drops were expressed after several compressions.  LC encouraged breastfeeding, but she had recently given formula, so declined at that time.

## 2019-02-14 NOTE — PROGRESS NOTES
Public Health Nurse (PHN) met with patient, discussed resources within [facility].  Provided family resources of [facility] Public Health services resource card, home visiting card, community resource guide and car seat information card given and discussed.  Family is aware how to add baby to insurance and have a primary provider arranged for baby.  Explained how to self refer to [facility]. Patient declined any questions or concerns.

## 2019-02-14 NOTE — PROGRESS NOTES
Met with Georgiana and chart/notes reviewed.  Pt has a compound presenting pregnancy.  Last check at 5:30 showed a fetal hand in front of the head.  Cx was 4 cm at that point and an attempt was made to reduce the hand.     Check at this point shows the fetal hand still present ahead of the baby head.  Attempt was made again to reduce the hand and this examiner found the hand tightly pressed against the pelvis giving no room for reduction.    I talked through an  my concern for how all her babies have delivered quickly and at this point her cx is not changing.  As a grand multip she is at risk of post partum hemorrhage the longer she is on pitocin and additionally concern is expressed that the longer she stays 4 cm risk increases of getting infection and further increase her bleeding risk.    Family is okay waiting 3 more hours and rechecking, potentially making a decision to deliver by section if not changed by 11pm.

## 2019-02-14 NOTE — PLAN OF CARE
Patient vitally stable, voiding without issue, IV SL, tolerating regular diet, ambulating independently. Postpartum checks WDL. Incision with dermabond DIRK. Pain adequately managed with prn Ibu and scheduled Tylenol. Simethicone given x1 per patient request. Patient states she would like to breast and bottle feed, has only formula fed infant this shift. Attentive to infant. Patient's sister present and supportive.

## 2019-02-15 PROBLEM — Z98.891 PREVIOUS CESAREAN SECTION: Status: ACTIVE | Noted: 2019-02-15

## 2019-02-15 PROBLEM — O09.30 INSUFFICIENT PRENATAL CARE: Status: ACTIVE | Noted: 2019-02-15

## 2019-02-15 PROBLEM — O09.529 AMA (ADVANCED MATERNAL AGE) MULTIGRAVIDA 35+: Status: ACTIVE | Noted: 2019-02-15

## 2019-02-15 PROBLEM — D62 POSTOPERATIVE ANEMIA DUE TO ACUTE BLOOD LOSS: Status: ACTIVE | Noted: 2019-02-15

## 2019-02-15 PROCEDURE — 25000132 ZZH RX MED GY IP 250 OP 250 PS 637: Performed by: OBSTETRICS & GYNECOLOGY

## 2019-02-15 PROCEDURE — 12000000 ZZH R&B MED SURG/OB

## 2019-02-15 RX ORDER — FERROUS SULFATE 325(65) MG
325 TABLET ORAL DAILY
Status: DISCONTINUED | OUTPATIENT
Start: 2019-02-15 | End: 2019-02-16 | Stop reason: HOSPADM

## 2019-02-15 RX ADMIN — SENNOSIDES AND DOCUSATE SODIUM 1 TABLET: 8.6; 5 TABLET ORAL at 20:06

## 2019-02-15 RX ADMIN — OXYCODONE HYDROCHLORIDE 5 MG: 5 TABLET ORAL at 05:12

## 2019-02-15 RX ADMIN — IBUPROFEN 800 MG: 800 TABLET, FILM COATED ORAL at 22:51

## 2019-02-15 RX ADMIN — SENNOSIDES AND DOCUSATE SODIUM 1 TABLET: 8.6; 5 TABLET ORAL at 08:15

## 2019-02-15 RX ADMIN — IBUPROFEN 800 MG: 800 TABLET, FILM COATED ORAL at 01:40

## 2019-02-15 RX ADMIN — FERROUS SULFATE TAB 325 MG (65 MG ELEMENTAL FE) 325 MG: 325 (65 FE) TAB at 10:48

## 2019-02-15 RX ADMIN — OXYCODONE HYDROCHLORIDE 5 MG: 5 TABLET ORAL at 01:40

## 2019-02-15 RX ADMIN — ACETAMINOPHEN 975 MG: 325 TABLET, FILM COATED ORAL at 13:25

## 2019-02-15 RX ADMIN — OXYCODONE HYDROCHLORIDE 10 MG: 5 TABLET ORAL at 16:01

## 2019-02-15 RX ADMIN — ACETAMINOPHEN 975 MG: 325 TABLET, FILM COATED ORAL at 21:18

## 2019-02-15 RX ADMIN — ACETAMINOPHEN 975 MG: 325 TABLET, FILM COATED ORAL at 05:12

## 2019-02-15 RX ADMIN — OXYCODONE HYDROCHLORIDE 5 MG: 5 TABLET ORAL at 20:06

## 2019-02-15 RX ADMIN — IBUPROFEN 800 MG: 800 TABLET, FILM COATED ORAL at 08:15

## 2019-02-15 RX ADMIN — IBUPROFEN 800 MG: 800 TABLET, FILM COATED ORAL at 16:01

## 2019-02-15 NOTE — PLAN OF CARE
Pt meeting expected outcomes. Vitals stable. Fundus firm and midline. Denies difficulty voiding. Ibuprofen, tylenol and oxycodone given for pain. Incision is glued, DIRK. Ambulating frequently. Has not breast fed this shift, bottle feeding infant. Pt and her sister attentive to baby's needs.

## 2019-02-15 NOTE — PLAN OF CARE
Patient stable this shift. Started the shift very painful, plan put in place and patient now comfortable. Taking motrin, tylenol, and oxycodone. Incision clean and intact open to air. Up to shower. Ambulating in halls.

## 2019-02-15 NOTE — PLAN OF CARE
Pt meets expectations, ambulating in room and hallway well, incisional pain is controlled with ibuprofen and tylenol; feeding her infant with formula at shift, talked about discharge expectations via Slovenian , turned in the birth certificate and PPD, having visitors in room, cooperative.

## 2019-02-15 NOTE — PROGRESS NOTES
[facility] Obstetrics Post-Op / Progress Note         Assessment and Plan:    Assessment:   Post-operative day #2  Low transverse repeat  section  L&D complications: Scheduled  section  AMA  Inadequate prenatal care  Postop anemia      Doing well.  Clean wound without signs of infection.  No immediate surgical complications identified.  No excessive bleeding  Pain well-controlled.  Breast and bottle feeding      Plan:   Ambulation encouraged  Breast feeding strategies discussed  Lactation consultation  Monitor wound for signs of infection  Pain control measures as needed  Continue iron supplementation  Anticipate discharge tomorrow           Interval History:   Doing well.  Pain is controlled.  No fevers.  No history of wound drainage, warmth or significant erythema.  Good appetite.  Denies chest pain, shortness of breath, nausea or vomiting.  Breastfeeding and bottle feeding.          Significant Problems:   AMA  Previous  section  Insufficient prenatal care  S/p repeat  section  Postop anemia          Review of Systems:    The patient denies any chest pain, shortness of breath, excessive pain, fever, chills, purulent drainage from the wound, nausea or vomiting.          Medications:     All medications related to the patient's surgery have been reviewed    acetaminophen  975 mg Oral Q8H     sodium chloride (PF)  3 mL Intracatheter Q8H             Physical Exam:     All vitals stable  Patient Vitals for the past 12 hrs:   BP Temp Temp src Pulse Resp   02/15/19 0801 93/50 98.2  F (36.8  C) Oral 88 17   02/15/19 0139 (!) 89/52 98.2  F (36.8  C) Oral 73 16   Gen:  Pt is alert and oriented x 3.  No acute distress  Abd:  Soft, nondistended, fundus firm at umbilicus  Incision:  Wound clean and dry with minimal or no drainage.  Surrounding skin with minimal erythema.  Ext:  Nontender          Data:     All laboratory data related to this surgery reviewed  Hemoglobin   Date Value Ref  Range Status   02/14/2019 9.0 (L) 11.7 - 15.7 g/dL Final   02/12/2019 11.9 11.7 - 15.7 g/dL Final     No imaging studies have been ordered    Alejandra Forde MD

## 2019-02-16 VITALS
TEMPERATURE: 98.2 F | SYSTOLIC BLOOD PRESSURE: 104 MMHG | HEIGHT: 63 IN | OXYGEN SATURATION: 94 % | WEIGHT: 119 LBS | HEART RATE: 79 BPM | BODY MASS INDEX: 21.09 KG/M2 | RESPIRATION RATE: 16 BRPM | DIASTOLIC BLOOD PRESSURE: 64 MMHG

## 2019-02-16 PROCEDURE — 25000132 ZZH RX MED GY IP 250 OP 250 PS 637: Performed by: OBSTETRICS & GYNECOLOGY

## 2019-02-16 RX ORDER — AMOXICILLIN 250 MG
1 CAPSULE ORAL 2 TIMES DAILY PRN
Qty: 100 TABLET | Refills: 0 | Status: SHIPPED | OUTPATIENT
Start: 2019-02-16 | End: 2019-03-18

## 2019-02-16 RX ORDER — IBUPROFEN 800 MG/1
800 TABLET, FILM COATED ORAL EVERY 6 HOURS PRN
Qty: 30 TABLET | Refills: 0 | Status: SHIPPED | OUTPATIENT
Start: 2019-02-16 | End: 2019-03-18

## 2019-02-16 RX ORDER — ACETAMINOPHEN 325 MG/1
650 TABLET ORAL EVERY 6 HOURS PRN
Qty: 30 TABLET | Refills: 0 | Status: SHIPPED | OUTPATIENT
Start: 2019-02-16 | End: 2019-03-18

## 2019-02-16 RX ORDER — OXYCODONE HYDROCHLORIDE 5 MG/1
5 TABLET ORAL EVERY 6 HOURS PRN
Qty: 15 TABLET | Refills: 0 | Status: SHIPPED | OUTPATIENT
Start: 2019-02-16 | End: 2019-02-19

## 2019-02-16 RX ORDER — FERROUS SULFATE 325(65) MG
325 TABLET ORAL DAILY
Qty: 30 TABLET | Refills: 0 | Status: SHIPPED | OUTPATIENT
Start: 2019-02-17 | End: 2019-03-19

## 2019-02-16 RX ADMIN — ACETAMINOPHEN 975 MG: 325 TABLET, FILM COATED ORAL at 05:18

## 2019-02-16 RX ADMIN — IBUPROFEN 800 MG: 800 TABLET, FILM COATED ORAL at 05:18

## 2019-02-16 RX ADMIN — SENNOSIDES AND DOCUSATE SODIUM 1 TABLET: 8.6; 5 TABLET ORAL at 09:48

## 2019-02-16 RX ADMIN — FERROUS SULFATE TAB 325 MG (65 MG ELEMENTAL FE) 325 MG: 325 (65 FE) TAB at 09:47

## 2019-02-16 RX ADMIN — ACETAMINOPHEN 650 MG: 325 TABLET, FILM COATED ORAL at 09:47

## 2019-02-16 RX ADMIN — IBUPROFEN 800 MG: 800 TABLET, FILM COATED ORAL at 11:43

## 2019-02-16 NOTE — DISCHARGE SUMMARY
[facility]    Discharge Summary  Obstetrics    Date of Admission:  2019  Date of Discharge:  2019  Discharging Provider: Anna Dickson    Discharge Diagnoses   Patient Active Problem List   Diagnosis     S/P  section     AMA (advanced maternal age) multigravida 35+     Postoperative anemia due to acute blood loss     Insufficient prenatal care     Previous  section       History of Present Illness   Georgiana Montana is a 38 year old female now  who presented to L&D @ 39w0d GA. Her pregnancy has been complicated by prior CS, AMA, inadequate prenatal care . Please see her admit H&P for full details of her PMH, PSH, Meds, Allergies and exam on admit.    Hospital Course   The patient had a r-LTCS @ 39w0d, please see her delivery summary for full details.     Her postpartum course was uncomplicated. On postpartum day 3, she was meeting all of her postpartum goals and deemed stable for discharge. She was voiding without difficulty, tolerating a regular diet without nausea and vomiting, her pain was well controlled on oral pain medicines and her lochia was appropriate.    Hgb:   Lab Results   Component Value Date    HGB 9.0 2019    HGB 11.9 2019       Lab Results   Component Value Date    RH Pos 2019    and rhogam was not given     Contraception was discussed and will be addressed at her postpartum appointment.    Instructions:  1) Call for temperature greater than 100.4F, foul smelling vaginal discharge, bleeding more than 1 pad per hour for 2 hrs, pain not controlled by oral pain meds, severe constipation or severe nausea or vomiting.  2) She was instructed to follow-up with her primary OB in 6 weeks for a routine postpartum visit  3) She was instructed to continue her PNV on discharge if she wished to breast feed her infant.  4) will need 1 wk ost-op evaluation visit at which time she should have Nexplanon placed.    Anna Dickson MD    Discharge Disposition    Discharged to home   Condition at discharge: Satisfactory    Primary Care Physician   [facility]    Consultations This Hospital Stay   HOME CARE POST PARTUM/ IP CONSULT  LACTATION IP CONSULT    Discharge Orders   No discharge procedures on file.  Discharge Medications   Current Discharge Medication List      START taking these medications    Details   acetaminophen (TYLENOL) 325 MG tablet Take 2 tablets (650 mg) by mouth every 6 hours as needed for other (multimodal surgical pain management along with NSAIDS and opioid medication as indicated based on pain control and physical function.)  Qty: 30 tablet, Refills: 0    Associated Diagnoses: S/P  section      ferrous sulfate (FEROSUL) 325 (65 Fe) MG tablet Take 1 tablet (325 mg) by mouth daily  Qty: 30 tablet, Refills: 0    Associated Diagnoses: Postoperative anemia due to acute blood loss      ibuprofen (ADVIL/MOTRIN) 800 MG tablet Take 1 tablet (800 mg) by mouth every 6 hours as needed for other (cramping)  Qty: 30 tablet, Refills: 0    Associated Diagnoses: S/P  section      oxyCODONE (ROXICODONE) 5 MG tablet Take 1 tablet (5 mg) by mouth every 6 hours as needed for breakthrough pain or severe pain  Qty: 15 tablet, Refills: 0    Associated Diagnoses: S/P  section      senna-docusate (SENOKOT-S/PERICOLACE) 8.6-50 MG tablet Take 1 tablet by mouth 2 times daily as needed for constipation  Qty: 100 tablet, Refills: 0    Associated Diagnoses: S/P  section         CONTINUE these medications which have NOT CHANGED    Details   omeprazole (PRILOSEC) 20 MG DR capsule Take 20 mg by mouth daily      Prenatal MV-Min-Fe Fum-FA-DHA (PRENATAL 1 PO) Take 1 tablet by mouth daily            Allergies   No Known Allergies

## 2019-02-16 NOTE — PLAN OF CARE
VSS, continues to ambulate well at shift, attempted to breast feed today but mostly fed her infant with formula, planning on breast feeding at home; complained of headache and neck muscle pain, was treated with ibuprofen and warm packs applied to neck, pt stating her pain is relieving; discharge education is complete via [facility] , answered questions about follow up, anticipating discharge in the early afternoon.

## 2019-02-16 NOTE — DISCHARGE INSTRUCTIONS
Birth Discharge Instructions: [facility] lactation: [phone]  Waxqabadka    Pena qaadin wax kabadan 10 roodal 6 asbuuc kadib qalliinka. Waydii qoyska iyo saxibadaa caawin markaad u baahantahay.     Pena wadin gaadhi markaad qaadanayso kaniiniyada xanunka ee uu dhakhtarkaagu kuu qoro. Waxaad wadi kartaa gadhi haddii aad qaadanayso kaniiniyada xanuunka ee koontarka.     Lama ogala jimicsi adag ama howl culus 6 asbuuc. Pena samayn wax dhib ku keeni rakesh meesha qalliinka lagu sameeeyay.    Pena sebastian jiidin adoo aad u doconaya markaad isticmaalayso musqusha. Kooxdaada daryeelka ayaa waxay ku qori doonaan wax saxarada jilciya haddii ay dhibaato kaa haysato saxarada oo adag (caloosha oo fadhida) .    Ka taxadar meesha la qalay:    Meesha la qalay nadiif pena ahaato hana qallalnaato    Pena ku buuxin meesha la qalay biyo. Dabaal ama tuubooyinka biyaha kulul lama ogala ilaa uu qalliinku robert ahaanba [facility]. Waxaad isku furi kartaa tuubada qabayska haddii heerka biyuhu ay ka hoooseeyaan meesha qalliinka.    Dhaqidda kadib, meesha qalliinka si fican u qalaji. Sidoo kale ku ruth qalajinta maqarka u dhow meesha qaliinka ee ay istaaban karaan.     Ha isticmaalin wax subkid, jeel, mady, looshin ama wax kale oo aad mariso meesha qalliinka.    Dharkaaga u xiri qaab wanaagsan si aysan cadaadis ugu samayn meesha la qalay  (tusaale ahaan fiiri say u sebastian jiidayso kastuumadaadu)     Haddii aad leedahay Qodabada [place], faraha ka qaad tolmada. Iyagaa iskood isaga dhacaya ama waxaad siibi kartaa jacob asbuuc kadib.    Waxaad arki kartaa cadad roger oo grady cad ah ama binki ah waana iska caadi. U taga xiyaaga [facility] caafimaad:    Haddii raadka tolmuhu uu waynaado ama uu ur keeno.    Haddii aad ku yeelato meesha la tolay barar, guduudasho, ama cun cun.    Haddii aad yeelato xanuun kordhaya oo xanuunkaagana aysan daawadu wax ka tarayn.     [facility]  F (38  C) am aka saraysa (heerkulka laga qaaday  carabkaaga hoostiisa), oo qarqaryo leh ama aan lahayn     Meesha u dhow meesha la qalay ([facility] qalliinka) waxaad ka dareemi doontaa inaysan dareen lahayn. Nathan waa caadi. Dareen la aantu waxay u dhamaan doontaa wax kayar sanad.     Gacmahaagga nadiifi:  Markasta dhaqa gacahaaga ka hor inta aadan taaban farjiga agagaarkiisa  iyo meesha la tolay. Nathan waxay caawiniaysaa inuu yaraado infakshanku. Haddii gacmahaagu aysan wasakh lahayn, waxaad isticmaali kartaa aalkalo aad gacmaha ku tirtirto si aad u nadiifiso gacmahaaga. Cidiyahaaga nadiifi ooo jar.    Wac bixiyahaaga daryeelka caafimaaad haddii aad qabtid mid ka  mid ah [facility] iris socda:    Haddii suufka dhiigga nuuga uu ku buuxsamo 1 saac gudihiis, ama aad aragto xinjiro dhiig ah oo ka wayn kubadda golafka.    Dhiig soconaya wax kabadan 6 asbuuc.    Haddii aad qabto dheecaan farjiga ka imaanaya oo  si xun u uraya.    Morgan, nabar, majiirid daran oo aad ka dareeto qaybta hoose ee ubucda.    Kaadi badan oo dagdag ah oo markasta ku qabanaysa, ama gubasho aad dareento markaad kaajayso.    Lalabbo ama matag.    Luisduudasho, barar, ama xanuun aad ka dareento xididada lugta.    Dhibaato kaa haysata naas nuujinta, ama guduudasho ama xanuun naaska ah.    Xabad xanuun iyo qufac ama naqaska marcelo jessica friedman.    Maximeibaato ay joseph more aa walwal.   Haddii aad qabtjoy rodo jose francisco bains, [facility] rhina rivera.     Haddii aad qabto abbie felton [facility] flores.       Birth Discharge Instructions  Activity    Do not lift more than 10 pounds for 6 weeks after surgery. Ask family and friends for help when you need it.    Do not drive while taking pain pills prescribed by your doctor. You may drive if taking over-the-counter pain pills.    No heavy exercise or activity for 6 weeks. Don t do anything that will put a strain on your surgery site.    Don t strain when using the toilet.  Your care team may prescribe a stool softener if you have problems with your bowel movements (constipation).    To care for your incision:    Keep the incision clean and dry    Do not soak your incision in water. No swimming or hot tubs until your incision has fully healed. You may soak in the bathtub if the water level is below your incision.    After washing, dry your incision well. Include the skin that may come in contact with your incision.    Do not use any peroxide, gel, cream, lotion, or ointment on your incision.     Adjust your clothes to avoid pressure on your surgery site (check the elastic in your underwear for example)    If you have Steri-Strips, leave the small strips of tape in place. They will fall off on their own, or you can remove them after one week.    You may see a small amount of clear or pink drainage and this is normal. Check with your health care provider:    If the drainage increases or has an odor.    If you have swelling, redness, or a rash around the incision.    If you have increased pain and your pain medicine doesn t help    If you have a fever of 100.4  F (38  C) or higher (temperature taken under your tongue), with or without chills   The area around your incision (surgery wound) will feel numb. This is normal. The numbness should go away in less than a year.   Keep your hands clean:   Always wash your hands before touching your incision. This helps reduce your risk of infection. If your hands aren t dirty, you may use an alcohol hand-rub to clean your hands. Keep your nails clean and short.     Call your health care provider if you have any of these symptoms:    You soak a sanitary pad with blood within 1 hour, or you see blood clots larger than a golf ball.    Bleeding that lasts more than 6 weeks.    You have vaginal discharge that smells bad.    Severe pain, cramping or tenderness in your lower belly area.    A more frequent or urgent need to urinate (pee), or it burns when  you pee.    Nausea and vomiting.    Redness, swelling or pain around a vein in your leg.    Problems breastfeeding, or a red or painful area on your breast.    If you have chest pain and cough or are gasping for air.    Problems coping with sadness, anxiety, or depression.     If you have any concerns about hurting yourself of the baby, call your doctor right away.      You have questions or concerns after you return home.

## 2019-02-16 NOTE — PROGRESS NOTES
[facility]  POD# 3    Pt doing well. Ambulating, voiding, tolerating PO. Decreased lochia. Pain controlled. Breastfeeding. Requesting oxycodone to go home for optimal pain control. She is currently having a HA and had Tylenol just given by her RN. States she just needs to sleep a little bit more. FOB coming soon to take care of baby while she sleeps.     Vitals:    02/15/19 1602 02/15/19 2351 19 0518 19 0754   BP: 105/65 99/59  104/64   Pulse: 92 73  79   Resp: 18 18 18 16   Temp: 99  F (37.2  C) 98.5  F (36.9  C)  98.2  F (36.8  C)   TempSrc: Oral Oral  Oral   SpO2:       Weight:       Height:         Abd soft, NTGR, adequate uterine involution, no fundal tenderness, incision is well approached with stitches, no signs of infection  Ext no edema, no cyanosis, pulses +    Results for JESUS SALEEM (MRN [id number]) as of 2019 12:01   Ref. Range 2018 00:00 2019 15:50 2019 06:55   Hemoglobin Latest Ref Range: 11.7 - 15.7 g/dL  11.9 9.0 (L)       A/P 38 year old  at 39w0d s/p LTCS POD# 3.     1) Postpartum s/p rLTCS  -Continue routine post-partum/post-op care care.  - encourage ambulation  - encourage breast feeding  - continue PNVs  - will need 1-2 wks ost-op evaluation  - will need 6 wk PP visit  - she is interested in having Nexplanon for birth control   - I explained pt that at the time of making appointment for post-OP evaluation to mention she wants her Nexplanon placed at the same time so she can be allotted for the appropriate amount of time during visit.     2) post-op anemia secondary to blood loss  - continue ferrous sulfate supplementation    -D/c home today.    Dr. Lakisha Dickson  [phone]  2019 11:14 AM

## 2019-02-16 NOTE — PLAN OF CARE
Pt. VSS. Pain managed with scheduled tylenol and PRN ibuprofen. Incision DIRK, without signs of infection. Voiding adequately. Ambulating well. Independent in personal and infant cares. Formula feeding infant. Attentive to infant needs and bonding well with infant. Postpartum check, WDL.

## 2020-01-21 ENCOUNTER — HOSPITAL ENCOUNTER (EMERGENCY)
Facility: CLINIC | Age: 40
Discharge: HOME | End: 2020-01-21
Attending: EMERGENCY MEDICINE | Admitting: EMERGENCY MEDICINE
Payer: COMMERCIAL

## 2020-01-21 ENCOUNTER — APPOINTMENT (OUTPATIENT)
Dept: GENERAL RADIOLOGY | Facility: CLINIC | Age: 40
End: 2020-01-21
Attending: EMERGENCY MEDICINE
Payer: COMMERCIAL

## 2020-01-21 VITALS
BODY MASS INDEX: 22.68 KG/M2 | DIASTOLIC BLOOD PRESSURE: 81 MMHG | WEIGHT: 128 LBS | HEIGHT: 63 IN | TEMPERATURE: 99.6 F | OXYGEN SATURATION: 100 % | SYSTOLIC BLOOD PRESSURE: 121 MMHG | HEART RATE: 99 BPM | RESPIRATION RATE: 18 BRPM

## 2020-01-21 DIAGNOSIS — J11.1 INFLUENZA-LIKE ILLNESS: ICD-10-CM

## 2020-01-21 PROCEDURE — 71046 X-RAY EXAM CHEST 2 VIEWS: CPT

## 2020-01-21 PROCEDURE — 99283 EMERGENCY DEPT VISIT LOW MDM: CPT | Mod: 25

## 2020-01-21 PROCEDURE — 25000132 ZZH RX MED GY IP 250 OP 250 PS 637: Performed by: EMERGENCY MEDICINE

## 2020-01-21 RX ORDER — ALBUTEROL SULFATE 90 UG/1
2 AEROSOL, METERED RESPIRATORY (INHALATION) EVERY 4 HOURS PRN
Qty: 1 INHALER | Refills: 0 | Status: SHIPPED | OUTPATIENT
Start: 2020-01-21

## 2020-01-21 RX ORDER — IBUPROFEN 600 MG/1
600 TABLET, FILM COATED ORAL ONCE
Status: COMPLETED | OUTPATIENT
Start: 2020-01-21 | End: 2020-01-21

## 2020-01-21 RX ORDER — BENZONATATE 200 MG/1
200 CAPSULE ORAL 3 TIMES DAILY PRN
Qty: 15 CAPSULE | Refills: 0 | Status: SHIPPED | OUTPATIENT
Start: 2020-01-21

## 2020-01-21 RX ADMIN — IBUPROFEN 600 MG: 600 TABLET, FILM COATED ORAL at 15:05

## 2020-01-21 ASSESSMENT — ENCOUNTER SYMPTOMS
FEVER: 1
HEADACHES: 1
MYALGIAS: 1
COUGH: 1
SHORTNESS OF BREATH: 1
SORE THROAT: 1

## 2020-01-21 ASSESSMENT — MIFFLIN-ST. JEOR: SCORE: 1234.73

## 2020-01-21 NOTE — ED PROVIDER NOTES
"  History     Chief Complaint:  Cough     The history is provided by the patient and the spouse (Spouse translated for the patient). The history is limited by a language barrier.      Georgiana Montana is a 39 year old female who presents with her  for a cough. The patient developed a productive cough 4 days ago. She has developed an intermittent fever that began yesterday, measuring temperatures up to 101F.  She also notes body aches, sore throat, shortness of breath after coughing, and a headache. The patient has been using Tylenol intermittently, though has not taken any medications today. She has no history of asthma. Of note, her  was recently seen for similar illness.     Allergies:  No Known Allergies     Medications:    omeprazole    Past Medical History:    GERD  Postoperative anemia     Past Surgical History:    C section x 3    Family History:    No past pertinent family history.     Social History:  The patient was accompanied to the ED by her .  Tobacco use: negative   Alcohol use: negative   PCP: Clinic, [facility]     Review of Systems   Constitutional: Positive for fever.   HENT: Positive for sore throat.    Respiratory: Positive for cough and shortness of breath.    Musculoskeletal: Positive for myalgias.   Neurological: Positive for headaches.   All other systems reviewed and are negative.      Physical Exam     Patient Vitals for the past 24 hrs:   BP Temp Temp src Pulse Resp SpO2 Height Weight   01/21/20 1307 121/81 99.6  F (37.6  C) Temporal 99 18 100 % 1.6 m (5' 3\") 58.1 kg (128 lb)        Physical Exam    GEN:    Pleasant, age appropriate.     Resting comfortably in the bed.  HEENT:    Tympanic membranes are clear bilateral.      Oropharynx is moist.       No tonsillar erythema, exudate or asymmetric edema.     No deviation of the uvula.     No pooling of secretions, trismus or sublingual edema.  Eyes:    Conjunctiva normal, PERRL  Neck:    Supple, no meningismus.  "      No pain with manipulation of the hyoid.   CV:     Regular rate and rhythm     No murmurs, rubs or gallops.    PULM:    Clear to auscultation bilateral.       No respiratory distress.       No stridor, rales or wheezing.  ABD:    Soft, non-tender, non-distended.      No rebound or guarding.     No splenomegaly.  MSK:     No gross deformity to all four extremities.   LYMPH:   No cervical lymphadenopathy.  NEURO:   Alert.  Normal muscular tone, no atrophy.  Skin:    Warm, dry and intact.    PSYCH:    Mood is good and affect is appropriate.          Emergency Department Course     Imaging:  Radiology findings were communicated with the patient and family who voiced understanding of the findings.    XR chest PA & LAT:  No acute airspace disease, as per radiology.      Interventions:  1505 Advil 600 mg PO     Emergency Department Course:  Past medical records, nursing notes, and vitals reviewed.    1435 I performed an exam of the patient as documented above.     The patient was sent for a chest x-ray while in the emergency department, results above.     I personally reviewed the imaging results with the Patient and spouse and answered all related questions prior to discharge. I prescribed Tessalon and an albuterol inhaler.     Impression & Plan     Medical Decision Making:    Georgiana Montana is a 39 year old female who presents to the emergency department today with influenza-like illness.  No evidence of otitis media, streptococcal pharyngitis.  Chest x-ray is negative for pneumonia.  Patient has had symptoms for greater than 72 hours thus benefits of influenza testing is insignificant.  Patient is not a Tamiflu candidate if influenza present.  Signs are consistent with a viral syndrome.  Supportive measures indicated.  Patient safe for discharge home.      Discharge Diagnosis:    ICD-10-CM    1. Influenza-like illness R69      Disposition:  Discharged to home     Discharge Medications:  Discharge Medication List as of  1/21/2020  3:02 PM      START taking these medications    Details   albuterol (PROAIR HFA/PROVENTIL HFA/VENTOLIN HFA) 108 (90 Base) MCG/ACT inhaler Inhale 2 puffs into the lungs every 4 hours as needed for other (cough), Disp-1 Inhaler, R-0, Local Print      benzonatate (TESSALON) 200 MG capsule Take 1 capsule (200 mg) by mouth 3 times daily as needed for cough, Disp-15 capsule, R-0, Local Print             Scribe Disclosure:  I, Scarlet Cason, am serving as a scribe at 2:07 PM on 1/21/2020 to document services personally performed by Severiano English MD based on my observations and the provider's statements to me.       Severiano English MD  01/22/20 [id number]

## 2020-01-21 NOTE — LETTER
January 21, 2020      To Whom It May Concern:      Georgiana Montana was seen in our Emergency Department today, 01/21/20.  I expect her condition to improve over the next 2 days.  She may return to work when improved.    Sincerely,        Dajuan Tyson RN

## 2020-01-21 NOTE — ED AVS SNAPSHOT
[facility] Emergency Department  [address]  [facility] [address]  Phone:  [phone]  Fax:  [phone]                                    Georgiana Montana   MRN: [id number]    Department:  [facility] Emergency Department   Date of Visit:  1/21/2020           After Visit Summary Signature Page    I have received my discharge instructions, and my questions have been answered. I have discussed any challenges I see with this plan with the nurse or doctor.    ..........................................................................................................................................  Patient/Patient Representative Signature      ..........................................................................................................................................  Patient Representative Print Name and Relationship to Patient    ..................................................               ................................................  Date                                   Time    ..........................................................................................................................................  Reviewed by Signature/Title    ...................................................              ..............................................  Date                                               Time          22EPIC Rev 08/18

## 2020-07-31 NOTE — PLAN OF CARE
Pt is independent  and up ad bren . Taking Ibuprofen and oxycodone for pain . + BS, + flatus,+stool. Family here visiting.   hospitalization, the order may or may not be in effect during this procedure. I give my doctor permission to give me blood or blood products. I understand that there are risks with receiving blood such as hepatitis, AIDS, fever, or allergic reaction. I acknowledge that the risks, benefits, and alternatives of this treatment have been explained to me and that no express or implied warranty has been given by the hospital, any blood bank, or any person or entity as to the blood or blood components transfused. At the discretion of my doctor, I agree to allow observers, equipment/product representatives and allow photographing, and/or televising of the procedure, provided my name or identity is maintained confidentially. I agree the hospital may dispose of or use for scientific or educational purposes any tissue, fluid, or body parts which may be removed.     ________________________________Date________Time______ am/pm  ([place] [facility])  Patient or Signature of Closest Relative or Legal Guardian    ________________________________Date________Time______am/pm      Page 1 of  1  Witness

## (undated) DEVICE — GLOVE PROTEXIS W/NEU-THERA 5.5  2D73TE55

## (undated) DEVICE — SU VICRYL 0 CT-1 36" J346H

## (undated) DEVICE — SOL NACL 0.9% IRRIG 1000ML BOTTLE 2F7124

## (undated) DEVICE — TRANSFER DEVICE BLOOD NDL HOLDER 364880

## (undated) DEVICE — SU MONOCRYL 4-0 PS-2 18" UND Y496G

## (undated) DEVICE — LINEN BABY BLANKET 5434

## (undated) DEVICE — GLOVE PROTEXIS W/NEU-THERA 7.0  2D73TE70

## (undated) DEVICE — BLADE CLIPPER SGL USE 9680

## (undated) DEVICE — CAP BABY PINK/BLUE IC-2

## (undated) DEVICE — GLOVE PROTEXIS POWDER FREE SMT 7.0  2D72PT70X

## (undated) DEVICE — LINEN TOWEL PACK X10 5473

## (undated) DEVICE — PACK C-SECTION LF PL15OTA83B

## (undated) DEVICE — LINEN FULL SHEET 5511

## (undated) DEVICE — SOL WATER IRRIG 1000ML BOTTLE 2F7114

## (undated) DEVICE — GLOVE PROTEXIS BLUE W/NEU-THERA 6.0  2D73EB60

## (undated) DEVICE — BAG CLEAR TRASH 1.3M 39X33" P4040C

## (undated) DEVICE — SU MONOCRYL 0 CT-1 36" UND Y946H

## (undated) DEVICE — SUCTION CANISTER MEDIVAC LINER 3000ML W/LID 65651-530

## (undated) DEVICE — HEMOSTAT ABSORBABLE ARISTA 5GM SM0007-USA

## (undated) DEVICE — SU DERMABOND ADVANCED .7ML DNX6

## (undated) DEVICE — STOCKING SLEEVE VASOPRESS COMPRESSION CALF MED VP501M

## (undated) DEVICE — CATH TRAY FOLEY 16FR SILICONE 907416

## (undated) DEVICE — LINEN HALF SHEET 5512

## (undated) DEVICE — ESU GROUND PAD ADULT W/CORD E7507

## (undated) DEVICE — SU VICRYL 4-0 PS-2 18" UND J496H

## (undated) RX ORDER — OXYTOCIN/0.9 % SODIUM CHLORIDE 30/500 ML
PLASTIC BAG, INJECTION (ML) INTRAVENOUS
Status: DISPENSED
Start: 2019-02-13

## (undated) RX ORDER — PHENYLEPHRINE HCL IN 0.9% NACL 1 MG/10 ML
SYRINGE (ML) INTRAVENOUS
Status: DISPENSED
Start: 2019-02-13

## (undated) RX ORDER — ONDANSETRON 2 MG/ML
INJECTION INTRAMUSCULAR; INTRAVENOUS
Status: DISPENSED
Start: 2019-02-13

## (undated) RX ORDER — MORPHINE SULFATE 1 MG/ML
INJECTION, SOLUTION EPIDURAL; INTRATHECAL; INTRAVENOUS
Status: DISPENSED
Start: 2019-02-13

## (undated) RX ORDER — EPHEDRINE SULFATE 50 MG/ML
INJECTION, SOLUTION INTRAMUSCULAR; INTRAVENOUS; SUBCUTANEOUS
Status: DISPENSED
Start: 2019-02-13

## (undated) RX ORDER — KETOROLAC TROMETHAMINE 30 MG/ML
INJECTION, SOLUTION INTRAMUSCULAR; INTRAVENOUS
Status: DISPENSED
Start: 2019-02-13